# Patient Record
Sex: MALE | Race: BLACK OR AFRICAN AMERICAN | NOT HISPANIC OR LATINO | URBAN - METROPOLITAN AREA
[De-identification: names, ages, dates, MRNs, and addresses within clinical notes are randomized per-mention and may not be internally consistent; named-entity substitution may affect disease eponyms.]

---

## 2019-06-23 ENCOUNTER — EMERGENCY (EMERGENCY)
Facility: HOSPITAL | Age: 44
LOS: 1 days | Discharge: HOME | End: 2019-06-23
Attending: EMERGENCY MEDICINE | Admitting: EMERGENCY MEDICINE
Payer: COMMERCIAL

## 2019-06-23 VITALS
SYSTOLIC BLOOD PRESSURE: 122 MMHG | HEART RATE: 86 BPM | RESPIRATION RATE: 18 BRPM | OXYGEN SATURATION: 100 % | TEMPERATURE: 98 F | DIASTOLIC BLOOD PRESSURE: 78 MMHG

## 2019-06-23 PROCEDURE — 99283 EMERGENCY DEPT VISIT LOW MDM: CPT

## 2019-06-23 RX ORDER — CYCLOPENTOLATE HYDROCHLORIDE 10 MG/ML
1 SOLUTION/ DROPS OPHTHALMIC
Qty: 1 | Refills: 0
Start: 2019-06-23 | End: 2019-06-29

## 2019-06-23 RX ORDER — OFLOXACIN 0.3 %
2 DROPS OPHTHALMIC (EYE)
Qty: 1 | Refills: 0
Start: 2019-06-23 | End: 2019-06-29

## 2019-06-23 NOTE — ED ADULT TRIAGE NOTE - CHIEF COMPLAINT QUOTE
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 No pmh.

## 2019-06-23 NOTE — ED PROVIDER NOTE - CLINICAL SUMMARY MEDICAL DECISION MAKING FREE TEXT BOX
Pt c suspected metallic FB in right eye.  No e/o globe rupture.  Will obtain Ophtho eval.  Reassess.

## 2019-06-23 NOTE — ED PROVIDER NOTE - NSFOLLOWUPINSTRUCTIONS_ED_ALL_ED_FT
You have a corneal abrasion because of a foreign body that was in your eye.     Please do the followin) Apply 2 drops ofloxacin to right eye 4 times a day   2) Apply 2 drops cyclopentolate to right eye three times a day   3) Use safety goggles whenever you're at work  4) Follow up in the eye clinic on Tuesday    You may take Ibuprofen 600 mg every 6 hours as needed for pain.      Return to the ER for fevers, worsening pain, blurry vision, or any other concerning signs.

## 2019-06-23 NOTE — ED PROVIDER NOTE - EYES, MLM
Clear bilaterally, pupils equal, round and reactive to light. POssible FB in right eye.  Ophtho present at bedside during initial eval and will complete eye exam.

## 2019-06-23 NOTE — CONSULT NOTE ADULT - SUBJECTIVE AND OBJECTIVE BOX
[facility] Ophthalmology Consult Note    HPI: 44 y/o M, , was working on ceiling yesterday when he felt a piece of metal hit his right eye. Experienced eye pain, mild blurred vision, and photophobia + tearing. Presents today to [facility] ED. Denies flashes/floaters, diplopia. Was not wearing safety goggles.       PMH: None  Meds: None  POcHx (including surgeries/lasers/trauma):  wears reading glasses  Drops: None  FamHx: None  Social Hx: works in construction  Allergies: NKDA    ROS:  General (neg), Vision (per HPI), Head and Neck (neg), Pulm (neg), CV (neg), GI (neg),  (neg), Musculoskeletal (neg), Skin/Integ (neg), Neuro (neg), Endocrine (neg), Heme (neg), All/Immuno (neg)    Mood and Affect Appropriate ( x ),  Oriented to Time, Place, and Person x 3 ( x )    Ophthalmology Exam    Visual acuity (sc): 20/20 OU  Pupils: PERRL OU, no APD  Ttono: 10, 12  Extraocular movements (EOMs): Full OU, no pain, no diplopia      Slit Lamp Exam (SLE)  External:  Flat OU  Lids/Lashes/Lacrimal Ducts: Lids everted and swept OD, no evidence of further FB OD  Sclera/Conjunctiva:  trace bulbar injection OD, W + Q OS  Cornea: superior paracentral cornea with 0.3x0.3 epi defect, no rust ring or FB seen OD  Anterior Chamber: trace cell OD, D + Q OS   Iris:  Flat OU  Lens:  Cl OU    Fundus Exam: dilated with 1% tropicamide and 2.5% phenylephrine  Approval obtained from primary team for dilation  Patient aware that pupils can remained dilated for at least 4-6 hours  Exam performed with 20D lens    Vitreous: wnl OU  Disc, cup/disc: sharp and pink, 0.4 OU  Macula:  wnl OU  Vessels:  wnl OU  Periphery: wnl OU    Diagnostic Testing:    Assessment:      Plan:  - ofloxacin QID to right eye  - cyclopentolate gtt TID to right eye  - stressed safety goggle use  - f/u eye clinic on Tuesday, business card given    Follow-Up:  Patient should follow up his/her ophthalmologist or in the [facility]mology Practice on Tuesday, 1 PM  [address] [place] [address]  [phone] [facility] Ophthalmology Consult Note    HPI: 42 y/o M, , was working on ceiling yesterday when he felt a piece of metal hit his right eye. Experienced eye pain, mild blurred vision, and photophobia + tearing. Presents today to [facility] ED. Denies flashes/floaters, diplopia. Was not wearing safety goggles.       PMH: None  Meds: None  POcHx (including surgeries/lasers/trauma):  wears reading glasses  Drops: None  FamHx: None  Social Hx: works in construction  Allergies: NKDA    ROS:  General (neg), Vision (per HPI), Head and Neck (neg), Pulm (neg), CV (neg), GI (neg),  (neg), Musculoskeletal (neg), Skin/Integ (neg), Neuro (neg), Endocrine (neg), Heme (neg), All/Immuno (neg)    Mood and Affect Appropriate ( x ),  Oriented to Time, Place, and Person x 3 ( x )    Ophthalmology Exam    Visual acuity (sc): 20/20 OU  Pupils: PERRL OU, no APD  Ttono: 10, 12  Extraocular movements (EOMs): Full OU, no pain, no diplopia      Slit Lamp Exam (SLE)  External:  Flat OU  Lids/Lashes/Lacrimal Ducts: Lids everted and swept OD, no evidence of further FB OD  Sclera/Conjunctiva:  trace bulbar injection OD, W + Q OS  Cornea: superior paracentral cornea with 0.3x0.3 epi defect, no rust ring or FB seen OD  Anterior Chamber: trace cell OD, D + Q OS   Iris:  Flat OU  Lens:  Cl OU    Fundus Exam: dilated with 1% tropicamide and 2.5% phenylephrine  Approval obtained from primary team for dilation  Patient aware that pupils can remained dilated for at least 4-6 hours  Exam performed with 20D lens    Vitreous: wnl OU  Disc, cup/disc: sharp and pink, 0.2 OU  Macula:  wnl OU  Vessels:  wnl OU  Periphery: wnl OU    Diagnostic Testing: none    Assessment:  42 y/o M, , was working on ceiling yesterday when he felt a piece of metal hit his right eye. VA 20/20, no APD, pressures WNL. Small 0.3x0.3 epi defect seen, but no rust ring or FB, likely patient dislodged it himself by blinking. Mild inflamamtion seen in AC, will treat for traumatic iritis. DFE WNL.        Plan:  - ofloxacin QID to right eye  - cyclopentolate gtt TID to right eye  - stressed safety goggle use  - f/u eye clinic on Tuesday, business card given    Follow-Up:  Patient should follow up his/her ophthalmologist or in the [facility] on Tuesday, 1 PM  [address] [place] [address]  [phone]

## 2019-06-23 NOTE — CONSULT NOTE ADULT - ATTENDING COMMENTS
Have not examined patient but have reviewed note and agree with resident findings above. Patient should follow with [facility] Ophthalmology within 1 week fo discharge.

## 2020-07-07 NOTE — ED PROVIDER NOTE - OBJECTIVE STATEMENT
44 y/o male c non contrib PMHx, working on ceiling yesterday when he felt a piece of something hit his right eye. Has been having eye pain, mild blurred vision, and photophobia + tearing. Denies flashes/floaters, diplopia. Was not wearing safety goggles.  HAs happened before.
23

## 2021-04-15 PROBLEM — Z78.9 OTHER SPECIFIED HEALTH STATUS: Chronic | Status: ACTIVE | Noted: 2019-07-04

## 2021-07-06 ENCOUNTER — APPOINTMENT (OUTPATIENT)
Dept: HUMAN REPRODUCTION | Facility: CLINIC | Age: 46
End: 2021-07-06

## 2021-11-02 ENCOUNTER — APPOINTMENT (OUTPATIENT)
Dept: HUMAN REPRODUCTION | Facility: CLINIC | Age: 46
End: 2021-11-02

## 2022-12-08 ENCOUNTER — EMERGENCY (EMERGENCY)
Facility: HOSPITAL | Age: 47
LOS: 1 days | Discharge: HOME | End: 2022-12-08
Attending: EMERGENCY MEDICINE | Admitting: EMERGENCY MEDICINE

## 2022-12-08 VITALS
RESPIRATION RATE: 20 BRPM | HEART RATE: 78 BPM | OXYGEN SATURATION: 100 % | SYSTOLIC BLOOD PRESSURE: 128 MMHG | TEMPERATURE: 98 F | DIASTOLIC BLOOD PRESSURE: 83 MMHG

## 2022-12-08 VITALS
SYSTOLIC BLOOD PRESSURE: 132 MMHG | RESPIRATION RATE: 18 BRPM | DIASTOLIC BLOOD PRESSURE: 97 MMHG | TEMPERATURE: 98 F | HEART RATE: 68 BPM | OXYGEN SATURATION: 100 %

## 2022-12-08 LAB
ALBUMIN SERPL ELPH-MCNC: 4.2 G/DL — SIGNIFICANT CHANGE UP (ref 3.3–5)
ALP SERPL-CCNC: 59 U/L — SIGNIFICANT CHANGE UP (ref 40–120)
ALT FLD-CCNC: 23 U/L — SIGNIFICANT CHANGE UP (ref 4–41)
ANION GAP SERPL CALC-SCNC: 8 MMOL/L — SIGNIFICANT CHANGE UP (ref 7–14)
AST SERPL-CCNC: 21 U/L — SIGNIFICANT CHANGE UP (ref 4–40)
BASOPHILS # BLD AUTO: 0.02 K/UL — SIGNIFICANT CHANGE UP (ref 0–0.2)
BASOPHILS NFR BLD AUTO: 0.4 % — SIGNIFICANT CHANGE UP (ref 0–2)
BILIRUB SERPL-MCNC: 0.2 MG/DL — SIGNIFICANT CHANGE UP (ref 0.2–1.2)
BUN SERPL-MCNC: 13 MG/DL — SIGNIFICANT CHANGE UP (ref 7–23)
CALCIUM SERPL-MCNC: 9.1 MG/DL — SIGNIFICANT CHANGE UP (ref 8.4–10.5)
CHLORIDE SERPL-SCNC: 106 MMOL/L — SIGNIFICANT CHANGE UP (ref 98–107)
CO2 SERPL-SCNC: 26 MMOL/L — SIGNIFICANT CHANGE UP (ref 22–31)
CREAT SERPL-MCNC: 1.01 MG/DL — SIGNIFICANT CHANGE UP (ref 0.5–1.3)
EGFR: 92 ML/MIN/1.73M2 — SIGNIFICANT CHANGE UP
EOSINOPHIL # BLD AUTO: 0.03 K/UL — SIGNIFICANT CHANGE UP (ref 0–0.5)
EOSINOPHIL NFR BLD AUTO: 0.7 % — SIGNIFICANT CHANGE UP (ref 0–6)
FLUAV AG NPH QL: SIGNIFICANT CHANGE UP
FLUBV AG NPH QL: SIGNIFICANT CHANGE UP
GLUCOSE SERPL-MCNC: 111 MG/DL — HIGH (ref 70–99)
HCT VFR BLD CALC: 40.9 % — SIGNIFICANT CHANGE UP (ref 39–50)
HGB BLD-MCNC: 12.7 G/DL — LOW (ref 13–17)
IANC: 2.58 K/UL — SIGNIFICANT CHANGE UP (ref 1.8–7.4)
IMM GRANULOCYTES NFR BLD AUTO: 0.2 % — SIGNIFICANT CHANGE UP (ref 0–0.9)
LYMPHOCYTES # BLD AUTO: 1.61 K/UL — SIGNIFICANT CHANGE UP (ref 1–3.3)
LYMPHOCYTES # BLD AUTO: 35.6 % — SIGNIFICANT CHANGE UP (ref 13–44)
MAGNESIUM SERPL-MCNC: 2 MG/DL — SIGNIFICANT CHANGE UP (ref 1.6–2.6)
MCHC RBC-ENTMCNC: 25.1 PG — LOW (ref 27–34)
MCHC RBC-ENTMCNC: 31.1 GM/DL — LOW (ref 32–36)
MCV RBC AUTO: 81 FL — SIGNIFICANT CHANGE UP (ref 80–100)
MONOCYTES # BLD AUTO: 0.27 K/UL — SIGNIFICANT CHANGE UP (ref 0–0.9)
MONOCYTES NFR BLD AUTO: 6 % — SIGNIFICANT CHANGE UP (ref 2–14)
NEUTROPHILS # BLD AUTO: 2.58 K/UL — SIGNIFICANT CHANGE UP (ref 1.8–7.4)
NEUTROPHILS NFR BLD AUTO: 57.1 % — SIGNIFICANT CHANGE UP (ref 43–77)
NRBC # BLD: 0 /100 WBCS — SIGNIFICANT CHANGE UP (ref 0–0)
NRBC # FLD: 0 K/UL — SIGNIFICANT CHANGE UP (ref 0–0)
PLATELET # BLD AUTO: 271 K/UL — SIGNIFICANT CHANGE UP (ref 150–400)
POTASSIUM SERPL-MCNC: 4.1 MMOL/L — SIGNIFICANT CHANGE UP (ref 3.5–5.3)
POTASSIUM SERPL-SCNC: 4.1 MMOL/L — SIGNIFICANT CHANGE UP (ref 3.5–5.3)
PROT SERPL-MCNC: 7.6 G/DL — SIGNIFICANT CHANGE UP (ref 6–8.3)
RBC # BLD: 5.05 M/UL — SIGNIFICANT CHANGE UP (ref 4.2–5.8)
RBC # FLD: 13.3 % — SIGNIFICANT CHANGE UP (ref 10.3–14.5)
RSV RNA NPH QL NAA+NON-PROBE: SIGNIFICANT CHANGE UP
SARS-COV-2 RNA SPEC QL NAA+PROBE: SIGNIFICANT CHANGE UP
SODIUM SERPL-SCNC: 140 MMOL/L — SIGNIFICANT CHANGE UP (ref 135–145)
WBC # BLD: 4.52 K/UL — SIGNIFICANT CHANGE UP (ref 3.8–10.5)
WBC # FLD AUTO: 4.52 K/UL — SIGNIFICANT CHANGE UP (ref 3.8–10.5)

## 2022-12-08 PROCEDURE — 99284 EMERGENCY DEPT VISIT MOD MDM: CPT

## 2022-12-08 RX ORDER — SODIUM CHLORIDE 9 MG/ML
1000 INJECTION INTRAMUSCULAR; INTRAVENOUS; SUBCUTANEOUS ONCE
Refills: 0 | Status: COMPLETED | OUTPATIENT
Start: 2022-12-08 | End: 2022-12-08

## 2022-12-08 RX ADMIN — SODIUM CHLORIDE 1000 MILLILITER(S): 9 INJECTION INTRAMUSCULAR; INTRAVENOUS; SUBCUTANEOUS at 06:10

## 2022-12-08 NOTE — ED PROVIDER NOTE - CLINICAL SUMMARY MEDICAL DECISION MAKING FREE TEXT BOX
47 year old with intermittent dizziness.  suspect dehydration vs viral syndrome. no evidence of posterio stroke on exam.  labs flu swab hydration reassess. will check cb for anemia. cmp for renal failure or electrolyte abn. symptom control

## 2022-12-08 NOTE — ED ADULT NURSE NOTE - OBJECTIVE STATEMENT
Patient received to room 1. Patient presents to the ED c/o dizziness x3 days. Patient is A&Ox4 and ambulatory at baseline. Patient received to room 1. Patient presents to the ED c/o dizziness x3 days. Patient is A&Ox4 and ambulatory at baseline. Patient denies PMH and PSH. Patient states he has been experiencing intermittent episodes of dizziness with imbalance x3 days. Patient denies fevers, chills, lightheadedness, nausea/vomiting, change in level of conscious, changes in vision and speech, and head trauma. VSS, no neuro deficits noted, airway patent, chest rise equal bilaterally, lung sounds clear and equal bilaterally, respirations unlabored. Patient denies dyspnea, shortness of breath, and chest pain. Abdomen flat, soft and non-distended, patient denies changes in BMs and urine. Pulse/motor/sensory present in all four extremities. Skin intact, warm/pink/dry. 20G IV placed in left AC, labs drawn and sent, medications administered as prescribed, will continue to monitor.

## 2022-12-08 NOTE — ED PROVIDER NOTE - OBJECTIVE STATEMENT
47 year old no pmh with 3 days of dizziness in the morning.  patient states he felt ill after being out in the rain. no chest pain no headache no cough or sob. no abd pain. no neuro deficits. no head trauma.

## 2022-12-08 NOTE — ED ADULT TRIAGE NOTE - CHIEF COMPLAINT QUOTE
pt c/o intermittent dizziness worse when sitting to standing x3 days. Denies any associated symptoms

## 2022-12-08 NOTE — ED PROVIDER NOTE - PROGRESS NOTE DETAILS
Ralph: Symptoms improved, patient well-appearing vital stable given return precautions and copy of results

## 2022-12-08 NOTE — ED PROVIDER NOTE - SKIN, MLM
Body Location Override (Optional - Billing Will Still Be Based On Selected Body Map Location If Applicable): right lateral inferior chest Detail Level: Simple Add 08364 Cpt? (Important Note: In 2017 The Use Of 18472 Is Being Tracked By Cms To Determine Future Global Period Reimbursement For Global Periods): yes Skin normal color for race, warm, dry and intact. No evidence of rash.

## 2022-12-23 ENCOUNTER — APPOINTMENT (OUTPATIENT)
Dept: OPHTHALMOLOGY | Facility: CLINIC | Age: 47
End: 2022-12-23

## 2023-06-11 NOTE — ED PROVIDER NOTE - PATIENT PORTAL LINK FT
Spontaneous, unlabored and symmetrical
You can access the FollowMyHealth Patient Portal offered by [facility] by registering at the following website: [email or web address] By joining [facility]’s FollowMyHealth portal, you will also be able to view your health information using other applications (apps) compatible with our system.

## 2023-06-27 ENCOUNTER — APPOINTMENT (OUTPATIENT)
Dept: HUMAN REPRODUCTION | Facility: CLINIC | Age: 48
End: 2023-06-27

## 2023-07-25 PROBLEM — Z00.00 ENCOUNTER FOR PREVENTIVE HEALTH EXAMINATION: Status: ACTIVE | Noted: 2023-07-25

## 2023-08-23 ENCOUNTER — APPOINTMENT (OUTPATIENT)
Dept: HUMAN REPRODUCTION | Facility: CLINIC | Age: 48
End: 2023-08-23
Payer: SELF-PAY

## 2023-08-23 PROCEDURE — 89322 SEMEN ANAL STRICT CRITERIA: CPT

## 2023-12-15 ENCOUNTER — APPOINTMENT (OUTPATIENT)
Dept: UROLOGY | Facility: CLINIC | Age: 48
End: 2023-12-15